# Patient Record
Sex: MALE | Race: WHITE | NOT HISPANIC OR LATINO | Employment: OTHER | URBAN - METROPOLITAN AREA
[De-identification: names, ages, dates, MRNs, and addresses within clinical notes are randomized per-mention and may not be internally consistent; named-entity substitution may affect disease eponyms.]

---

## 2017-01-16 ENCOUNTER — ALLSCRIPTS OFFICE VISIT (OUTPATIENT)
Dept: OTHER | Facility: OTHER | Age: 82
End: 2017-01-16

## 2017-04-10 ENCOUNTER — ALLSCRIPTS OFFICE VISIT (OUTPATIENT)
Dept: OTHER | Facility: OTHER | Age: 82
End: 2017-04-10

## 2017-04-18 ENCOUNTER — ALLSCRIPTS OFFICE VISIT (OUTPATIENT)
Dept: OTHER | Facility: OTHER | Age: 82
End: 2017-04-18

## 2017-06-06 ENCOUNTER — GENERIC CONVERSION - ENCOUNTER (OUTPATIENT)
Dept: OTHER | Facility: OTHER | Age: 82
End: 2017-06-06

## 2017-07-18 ENCOUNTER — ALLSCRIPTS OFFICE VISIT (OUTPATIENT)
Dept: OTHER | Facility: OTHER | Age: 82
End: 2017-07-18

## 2017-10-04 ENCOUNTER — ALLSCRIPTS OFFICE VISIT (OUTPATIENT)
Dept: OTHER | Facility: OTHER | Age: 82
End: 2017-10-04

## 2017-10-05 ENCOUNTER — GENERIC CONVERSION - ENCOUNTER (OUTPATIENT)
Dept: OTHER | Facility: OTHER | Age: 82
End: 2017-10-05

## 2017-10-18 ENCOUNTER — ALLSCRIPTS OFFICE VISIT (OUTPATIENT)
Dept: OTHER | Facility: OTHER | Age: 82
End: 2017-10-18

## 2017-10-27 NOTE — PROGRESS NOTES
Assessment  Assessed    1  Arteriosclerosis of coronary artery (414 00) (I25 10)   2  Atrial flutter (427 32) (I48 92)   3  Hypercholesterolemia (272 0) (E78 00)   4  Hypertension (401 9) (I10)   5  Paroxysmal atrial fibrillation (427 31) (I48 0)   6  History of CABG    Plan  Atrial flutter    · EKG/ECG- POC; Status:Complete;   Done: 56OZI0106 02:18PM   Perform: In Office; Last Updated Jyothi Sunshine; 10/4/2017 2:19:04 PM;Ordered; For:Atrial flutter; Ordered By:Miguel Mejia;  Paroxysmal atrial fibrillation    · Eliquis 2 5 MG Oral Tablet; 1 TAB TWICE A DAY   Rx By: Sarah Santos; Dispense: 60 Days ; #:60 Tablet; Refill: 3;For: Paroxysmal atrial fibrillation; CHELSY = N; [facility]: [facility]   · Follow-up visit in 3 months Evaluation and Treatment  Follow-up  Status: Hold For -  Scheduling  Requested for: 17IQY9227   Ordered; For: Paroxysmal atrial fibrillation; Ordered By: Sarah Santos Performed:  Due: 27QJP2157   · A diet that is low in fat, cholesterol, and sodium is considered a cardiac diet ;  Status:Complete;   Done: 63CBN0734 02:29PM   Ordered; For:Paroxysmal atrial fibrillation; Ordered By:Miguel Mejia;   · Begin or continue regular aerobic exercise  Gradually work up to at least {count1}  sessions of {dur1} of exercise a week ; Status:Complete;   Done: 49AKI0778 02:29PM   Ordered; For:Paroxysmal atrial fibrillation; Ordered By:Miguel Mejia; Discussion/Summary  Cardiology Discussion Summary Free Text Note Form [facility]:   CAD: s/p CABG x5 in 1995, stable and asymptomatic  Continue medical therapy with ASA, statin, and ACE-I  He could not tolerate the B-blocker due to extreme fatigue and low BPs - but now restarted on metoprolol due to atrial tachycardia and NSVT seen on PPM interrogation  Tolerating it thus far - have recommended that he takes his metoprolol at night to reduce the fatigue  flutter: now also with paroxysms of atrial fibrillation  Not on chronic anticoagulation  Continue regular PPM interrogations  Started on metoprolol and noted to have overall less tachycardia than before starting on B-blocker  CHADS2-Vasc score of 4 - will initiate on Eliquis 2 5mg BID  well controlled on current regimen  continue statin  LDL 83 in Sept 2016, well controlled  Recheck after next visit  Counseling Documentation With Imm: The patient was counseled regarding diagnostic results,-- instructions for management,-- risk factor reductions,-- impressions  total time of encounter was 25 minutes-- and-- 15 minutes was spent counseling  Chief Complaint  Chief Complaint Free Text Note Form: 5 month cardiac follow up  No cardiac complaints  EKG today  History of Present Illness  Cardiology (Follow-Up): The patient states he has been generally doing well since the last visit  Comorbid Illnesses: hypertension,-- hyperlipidemia-- and-- CAD s/p CABG x5 in 1995; atrial flutter, s/p PPM    Interval Events: Feels well, no complaints  Had 4 5 hrs of afib on most recent device interrogation- asymptomatic from it  Symptoms: denies chest pain at rest,-- denies exertional chest pain,-- denies dyspnea,-- denies fatigue,-- denies exercise intolerance,-- denies palpitations,-- denies edema,-- denies orthopnea,-- denies claudication,-- denies dizziness-- and-- denies orthostatic dizziness  Associated symptoms: no syncope,-- no PND,-- no tendency for easy bleeding,-- no tendency for easy bruising,-- no TIA symptoms-- and-- no recent weight gain  His symptoms do not limit his activities  Disease Monitoring:   Medications: the patient is adherent with his medication regimen  -- He denies medication side effects  Review of Systems  Cardiology Male ROS:     Cardiac: No complaints of chest pain, no palpitations, no fainiting  Skin: No complaints of nonhealing sores or skin rash     Genitourinary: No complaints of recurrent urinary tract infections, frequent urination at night, difficult urination, blood in urine, kidney stones, loss of bladder control, no kidney or prostate problems, no erectile dysfunction  Psychological: No complaints of feeling depressed, anxiety, panic attacks, or difficulty concentrating  General: No complaints of trouble sleeping, lack of energy, fatigue, appetite changes, weight changes, fever, frequent infections, or night sweats  Respiratory: No complaints of shortness of breath, cough with sputum, or wheezing  HEENT: No complaints of serious problems, hearing problems, nose problems, throat problems, or snoring  Gastrointestinal: No complaints of liver problems, nausea, vomiting, heartburn, constipation, bloody stools, diarrhea, problems swallowing, adbominal pain, or rectal bleeding  Hematologic: No complaints of bleeding disorders, anemia, blood clots, or excessive brusing  Neurological: No complaints of numbness, tingling, dizziness, weakness, seizures, headaches, syncope or fainting, AM fatigue, daytime sleepiness, no witnessed apnea episodes  Musculoskeletal: No complaints of arthritis, back pain, or painfull swelling  ROS Reviewed:   ROS reviewed  Active Problems  Problems    1  Arteriosclerosis of coronary artery (414 00) (I25 10)   2  Arthritis (716 90) (M19 90)   3  Atrial flutter (427 32) (I48 92)   4  Cataract (366 9) (H26 9)   5  Diabetes Mellitus (250 00)   6  Hypercholesterolemia (272 0) (E78 00)   7  Hypertension (401 9) (I10)   8  Memory Lapses Or Loss (780 93)   9  Paroxysmal atrial fibrillation (427 31) (I48 0)    Past Medical History  Problems    1  History of Atrial flutter (427 32) (I48 92)   2  History of sinus bradycardia (V12 59) (Z86 79)  Active Problems And Past Medical History Reviewed: The active problems and past medical history were reviewed and updated today  Surgical History  Problems    1  History of CABG   2  History of Catheter Ablation   3  History of Complete Colonoscopy   4  History of Hernia Repair   5  History of Pacemaker Placement  Surgical History Reviewed: The surgical history was reviewed and updated today  Family History  Father    1  Family history of Diabetes Mellitus (V18 0)   2  Family history of Hypertension (V17 49)   3  Family history of Reported Family History Of Cancer  Family History Reviewed: The family history was reviewed and updated today  Social History  Problems    · Marital History -  (V61 03)   · No drug use   · Social alcohol use (Z78 9)  Social History Reviewed: The social history was reviewed and updated today  The social history was reviewed and is unchanged  Current Meds   1  Aspirin 81 MG TABS; TAKE 1 TABLET DAILY; Therapy: 36MUE5893 to (Evaluate:68Bzh9506); Last Rx:02Jun2014 Ordered   2  Donepezil HCl - 10 MG Oral Tablet; TAKE 1 TABLET DAILY; Therapy: (Recorded:07Sep2016) to Recorded   3  Flomax 0 4 MG Oral Capsule; TAKE 1 CAPSULE Daily Recorded   4  Glimepiride 1 MG Oral Tablet; TAKE 1 TABLET DAILY; Therapy: (Recorded:72Tdq8923) to Recorded   5  Keppra 250 MG Oral Tablet; TAKE 0 5 TABLET Twice daily Recorded   6  Lisinopril 2 5 MG Oral Tablet; TAKE 1 TABLET DAILY; Therapy: (29-29-69-33) to Recorded   7  Metoprolol Succinate ER 25 MG Oral Tablet Extended Release 24 Hour; Take 1 tablet   daily; Therapy: 43EAC3477 to (Evaluate:05Jan2017); Last Rx:69Cbu1987 Ordered   8  Namenda 10 MG Oral Tablet; Take 1 tablet daily Recorded   9  NovoLOG Mix 70/30 (70-30) 100 UNIT/ML Subcutaneous Suspension; INJECT 25 UNIT   Every morning; Therapy: 67OGX7380 to (Evaluate:16Jun2017) Recorded   10  Simvastatin 10 MG Oral Tablet; Therapy: 28SXZ0516 to  Requested for: 39GAH3525 Recorded   11  Synthroid 125 MCG Oral Tablet; TAKE 1 TABLET DAILY; Therapy: (29-29-69-33) to Recorded  Medication List Reviewed: The medication list was reviewed and updated today  Allergies  Medication    1   No Known Drug Allergies    Vitals  Vital Signs Recorded: 79FHW2174 02:18PM   Heart Rate 71, Apical   Systolic 071, LUE, Sitting   Diastolic 68, LUE, Sitting   Height 5 ft 10 in   Weight 185 lb    BMI Calculated 26 54   BSA Calculated 2 02   O2 Saturation 98, RA     Physical Exam    Constitutional - General appearance: No acute distress, well appearing and well nourished  Eyes - Conjunctiva and Sclera examination: Conjunctiva pink, sclera anicteric  Neck - Normal, no JVD   Pulmonary - Respiratory effort: No signs of respiratory distress  -- Auscultation of lungs: Clear to auscultation  Cardiovascular - Auscultation of heart: Normal rate and rhythm, normal S1 and S2, no murmurs  -- Pedal pulses: Normal, 2+ bilaterally  -- Examination of extremities for edema and/or varicosities: Normal     Abdomen - Soft  Musculoskeletal - Gait and station: Normal gait  Skin - Skin: Normal without rashes  Skin is warm and well perfused  Neurologic - Speech normal  No focal deficits  Psychiatric - Orientation to person, place, and time: Normal       Results/Data  ECG Report:   Rhythm and rate: ectopic atrial rhythm with Electronic Ventricular paced rhythm with junctional retrograde conduction of atria        Future Appointments    Date/Time Provider Specialty Site   10/18/2017 08:30 AM Cardiology, Device Remote   [facility]     Signatures   Electronically signed by : Emilia Fothergill, M D ; Oct  4 2017  2:36PM EST                       (Author)

## 2018-01-13 VITALS
DIASTOLIC BLOOD PRESSURE: 60 MMHG | BODY MASS INDEX: 25.65 KG/M2 | HEART RATE: 72 BPM | HEIGHT: 70 IN | SYSTOLIC BLOOD PRESSURE: 108 MMHG | WEIGHT: 179.19 LBS

## 2018-01-14 VITALS
SYSTOLIC BLOOD PRESSURE: 138 MMHG | HEART RATE: 71 BPM | WEIGHT: 185 LBS | BODY MASS INDEX: 26.48 KG/M2 | DIASTOLIC BLOOD PRESSURE: 68 MMHG | OXYGEN SATURATION: 98 % | HEIGHT: 70 IN

## 2018-01-18 NOTE — CONSULTS
I had the pleasure of evaluating your patient, El Stewart  My full evaluation follows:      Chief Complaint  5 month cardiac follow up  No cardiac complaints  EKG today  History of Present Illness  The patient states he has been generally doing well since the last visit  Comorbid Illnesses: hypertension, hyperlipidemia and CAD s/p CABG x5 in 1995; atrial flutter, s/p PPM    Interval Events: Feels well, no complaints  Had 4 5 hrs of afib on most recent device interrogation- asymptomatic from it  Symptoms: denies chest pain at rest, denies exertional chest pain, denies dyspnea, denies fatigue, denies exercise intolerance, denies palpitations, denies edema, denies orthopnea, denies claudication, denies dizziness and denies orthostatic dizziness  Associated symptoms: no syncope, no PND, no tendency for easy bleeding, no tendency for easy bruising, no TIA symptoms and no recent weight gain  His symptoms do not limit his activities  Disease Monitoring:   Medications: the patient is adherent with his medication regimen  He denies medication side effects  Review of Systems      Cardiac: No complaints of chest pain, no palpitations, no fainiting  Skin: No complaints of nonhealing sores or skin rash  Genitourinary: No complaints of recurrent urinary tract infections, frequent urination at night, difficult urination, blood in urine, kidney stones, loss of bladder control, no kidney or prostate problems, no erectile dysfunction  Psychological: No complaints of feeling depressed, anxiety, panic attacks, or difficulty concentrating  General: No complaints of trouble sleeping, lack of energy, fatigue, appetite changes, weight changes, fever, frequent infections, or night sweats  Respiratory: No complaints of shortness of breath, cough with sputum, or wheezing  HEENT: No complaints of serious problems, hearing problems, nose problems, throat problems, or snoring     Gastrointestinal: No complaints of liver problems, nausea, vomiting, heartburn, constipation, bloody stools, diarrhea, problems swallowing, adbominal pain, or rectal bleeding  Hematologic: No complaints of bleeding disorders, anemia, blood clots, or excessive brusing  Neurological: No complaints of numbness, tingling, dizziness, weakness, seizures, headaches, syncope or fainting, AM fatigue, daytime sleepiness, no witnessed apnea episodes  Musculoskeletal: No complaints of arthritis, back pain, or painfull swelling  ROS reviewed  Active Problems    1  Arteriosclerosis of coronary artery (414 00) (I25 10)   2  Arthritis (716 90) (M19 90)   3  Atrial flutter (427 32) (I48 92)   4  Cataract (366 9) (H26 9)   5  Diabetes Mellitus (250 00)   6  Hypercholesterolemia (272 0) (E78 00)   7  Hypertension (401 9) (I10)   8  Memory Lapses Or Loss (780 93)   9  Paroxysmal atrial fibrillation (427 31) (I48 0)    Past Medical History    · History of Atrial flutter (427 32) (I48 92)   · History of sinus bradycardia (V12 59) (Z86 79)    The active problems and past medical history were reviewed and updated today  Surgical History    · History of CABG   · History of Catheter Ablation   · History of Complete Colonoscopy   · History of Hernia Repair   · History of Pacemaker Placement    The surgical history was reviewed and updated today  Family History    · Family history of Diabetes Mellitus (V18 0)   · Family history of Hypertension (V17 49)   · Family history of Reported Family History Of Cancer    The family history was reviewed and updated today  Social History    · Marital History -  (V61 03)   · No drug use   · Social alcohol use (Z78 9)  The social history was reviewed and updated today  The social history was reviewed and is unchanged  Current Meds   1  Aspirin 81 MG TABS; TAKE 1 TABLET DAILY; Therapy: 15OOE5745 to (Evaluate:28Vit4140); Last Rx:02Jun2014 Ordered   2   Donepezil HCl - 10 MG Oral Tablet; TAKE 1 TABLET DAILY; Therapy: (Recorded:07Sep2016) to Recorded   3  Flomax 0 4 MG Oral Capsule; TAKE 1 CAPSULE Daily Recorded   4  Glimepiride 1 MG Oral Tablet; TAKE 1 TABLET DAILY; Therapy: (Recorded:10Apr2017) to Recorded   5  Keppra 250 MG Oral Tablet; TAKE 0 5 TABLET Twice daily Recorded   6  Lisinopril 2 5 MG Oral Tablet; TAKE 1 TABLET DAILY; Therapy: (02 [phone]) to Recorded   7  Metoprolol Succinate ER 25 MG Oral Tablet Extended Release 24 Hour; Take 1 tablet   daily; Therapy: 25FPS8943 to (Evaluate:05Jan2017); Last Rx:07Sep2016 Ordered   8  Namenda 10 MG Oral Tablet; Take 1 tablet daily Recorded   9  NovoLOG Mix 70/30 (70-30) 100 UNIT/ML Subcutaneous Suspension; INJECT 25 UNIT   Every morning; Therapy: 06TUJ3779 to (Evaluate:16Jun2017) Recorded   10  Simvastatin 10 MG Oral Tablet; Therapy: 97LRR4602 to  Requested for: 69WBR8888 Recorded   11  Synthroid 125 MCG Oral Tablet; TAKE 1 TABLET DAILY; Therapy: ([phone] [phone]) to Recorded    The medication list was reviewed and updated today  Allergies    1  No Known Drug Allergies    Vitals   Recorded: 45ANP5524 02:18PM   Heart Rate 71, Apical   Systolic 416, LUE, Sitting   Diastolic 68, LUE, Sitting   Height 5 ft 10 in   Weight 185 lb    BMI Calculated 26 54   BSA Calculated 2 02   O2 Saturation 98, RA     Physical Exam    Constitutional - General appearance: No acute distress, well appearing and well nourished  Eyes - Conjunctiva and Sclera examination: Conjunctiva pink, sclera anicteric  Neck - Normal, no JVD   Pulmonary - Respiratory effort: No signs of respiratory distress  Auscultation of lungs: Clear to auscultation  Cardiovascular - Auscultation of heart: Normal rate and rhythm, normal S1 and S2, no murmurs  Pedal pulses: Normal, 2+ bilaterally  Examination of extremities for edema and/or varicosities: Normal     Abdomen - Soft  Musculoskeletal - Gait and station: Normal gait  Skin - Skin: Normal without rashes  Skin is warm and well perfused  Neurologic - Speech normal  No focal deficits  Psychiatric - Orientation to person, place, and time: Normal       Results/Data    Rhythm and rate: ectopic atrial rhythm with Electronic Ventricular paced rhythm with junctional retrograde conduction of atria  Assessment    1  Arteriosclerosis of coronary artery (414 00) (I25 10)   2  Atrial flutter (427 32) (I48 92)   3  Hypercholesterolemia (272 0) (E78 00)   4  Hypertension (401 9) (I10)   5  Paroxysmal atrial fibrillation (427 31) (I48 0)   6  History of CABG    Plan  Atrial flutter    · EKG/ECG- POC; Status:Complete;   Done: 81EVT8309 02:18PM   Perform: In Office; Last Updated Facundo Gifford; 10/4/2017 2:19:04 PM;Ordered; For:Atrial flutter; Ordered By:Miguel Mejia;  Paroxysmal atrial fibrillation    · Eliquis 2 5 MG Oral Tablet; 1 TAB TWICE A DAY   Rx By: Clau Warren; Dispense: 60 Days ; #:60 Tablet; Refill: 3; For: Paroxysmal atrial fibrillation; CHELSY = N; [facility]   · Follow-up visit in 3 months Evaluation and Treatment  Follow-up  Status: Hold For -  Scheduling  Requested for: 06NGO3967   Ordered; For: Paroxysmal atrial fibrillation; Ordered By: Clau Warren Performed:  Due: 80RZE6429   · A diet that is low in fat, cholesterol, and sodium is considered a cardiac diet ;  Status:Complete;   Done: 92HKH6364 02:29PM   Ordered; For:Paroxysmal atrial fibrillation; Ordered By:Miguel Mejia;   · Begin or continue regular aerobic exercise  Gradually work up to at least {count1}  sessions of {dur1} of exercise a week ; Status:Complete;   Done: 40ELT9424 02:29PM   Ordered; For:Paroxysmal atrial fibrillation; Ordered By:Miguel Mejia; Discussion/Summary    CAD: s/p CABG x5 in 1995, stable and asymptomatic  Continue medical therapy with ASA, statin, and ACE-I   He could not tolerate the B-blocker due to extreme fatigue and low BPs - but now restarted on metoprolol due to atrial tachycardia and NSVT seen on PPM interrogation  Tolerating it thus far - have recommended that he takes his metoprolol at night to reduce the fatigue  Atrial flutter: now also with paroxysms of atrial fibrillation  Not on chronic anticoagulation  Continue regular PPM interrogations  Started on metoprolol and noted to have overall less tachycardia than before starting on B-blocker  CHADS2-Vasc score of 4 - will initiate on Eliquis 2 5mg BID  HTN: well controlled on current regimen  HLD: continue statin  LDL 83 in Sept 2016, well controlled  Recheck after next visit  The patient was counseled regarding diagnostic results, instructions for management, risk factor reductions, impressions  total time of encounter was 25 minutes and 15 minutes was spent counseling  Thank you very much for allowing me to participate in the care of this patient  If you have any questions, please do not hesitate to contact me        Future Appointments    Signatures   Electronically signed by : HATTIE Mack ; Oct  4 2017  2:36PM EST                       (Author)

## 2018-03-07 NOTE — PROGRESS NOTES
"  Discussion/Summary  Normal device function      Results/Data  Cardiac Device Remote 18Apr2017 12:04PM Josiane Montero     Test Name Result Flag Reference   MISCELLANEOUS COMMENT (Report)     LATITUDE TRANSMISSION: BATTERY VOLTAGE ADEQUATE (4 YRS)  AP-14%, -99% (>40% CHB)  ALL AVAILABLE LEAD PARAMETERS WITHIN NORMAL LIMITS  1 NSVT EPISODE FOR 8 BEATS, AVG CL~360MS  EF-55-65% (ECHO 5/17/12)  1 ATR EPISODE LASTING 7 SEC- PAT ON EGM  10 PMT EPISODES- ST ON EGM'S  PT ON ASA 81MG & METOPROLOL  NORMAL DEVICE FUNCTION  GV   Cardiac Electrophysiology Report      [id number]  pdf   DEVICE TYPE Pacemaker       Cardiac Electrophysiology Report [id number] 12:04PM Josiane Montero     Test Name Result Flag Reference   Cardiac Electrophysiology Report      [id number]  pdf     Signatures   Electronically signed by : Lizbeth Monge RN; Apr 18 2017  9:19AM EST                       (Author)    Electronically signed by : HATTIE Beverly ; Apr 18 2017  1:13PM EST                       (Author)    "

## 2018-03-07 NOTE — PROGRESS NOTES
"  Discussion/Summary  Normal device function      Results/Data  Cardiac Device Remote [id number] 11:57AM Donna Martinez     Test Name Result Flag Reference   MISCELLANEOUS COMMENT (Report)     LATITUDE TRANSMISSION: BATTERY VOLTAGE ADEQUATE (4 YRS); AP = 15%,  = 99% (>40%/DDD 50 - CHB); 2 NSVT EPISODES WITH DURATION @ 5 BEATS/AVG  MS & 7 BEATS/AVG  MS; 2 PMT EPISODES FOR SINUS TACH vs  PAT; HX OF SAME WITH EF = 55-65% (5/2012); PT TAKES ASA 81, METOPROLOL; TASK TO DR BEAUCHAMP; ALL AVAILABLE LEAD PARAMETERS WITHIN NORMAL LIMITS; PACEMAKER FUNCTIONING APPROPRIATELY  eb   Cardiac Electrophysiology Report      [id number]  lat[place]  1 16 17 pdf   DEVICE TYPE Pacemaker       Cardiac Electrophysiology Report [id number] 11:57AM Donna Martinez     Test Name Result Flag Reference   Cardiac Electrophysiology Report      [id number] pdf     Signatures   Electronically signed by : Chanell Pina, ; Jan 16 2017  4:07PM EST                       (Author)    Electronically signed by : Jak Rivera DO; Jan 23 2017  4:03AM EST                       (Author)    "

## 2018-03-07 NOTE — PROGRESS NOTES
"  Discussion/Summary  Normal device function      Results/Data  Cardiac Device In Clinic [id number] 06:40PM Micky Plascencia     Test Name Result Flag Reference   MISCELLANEOUS COMMENT (Report)     DEVICE INTERROGATED IN THE [place] OFFICE: BATTERY VOLTAGE ADEQUATE  (3 5 YR)  AP 13%  99 9% (>40%/UNDERLYING HR ~ 30BPM)  ALL LEAD PARAMETERS WITHIN NORMAL LIMITS  1 NEW NSVT EPISODE WITHE EGM SHOWING NSVT (4 @ 147BPM)  10 ATR EPISODES WITH EGMS SHOWING AF (LONGEST IS 4 HR 43 MIN  )  5 PMT EPISODES FOR STACH  PT TAKES ASA 81MG AND METOPROLOL SUCC  Bethanie Ramos PT IS NOT ON AC  EF 55% (ECHO 5/2012)  TASKED AND SPOKE TO DR Donna Cespedes  NO PROGRAMMING CHANGES MADE TO DEVICE PARAMETERS  PACEMAKER FUNCTIONING APPROPRIATELY  RG   Cardiac Electrophysiology Report      [id number] 7-18-17  pdf   DEVICE TYPE Pacemaker       Cardiac Electrophysiology Report [id number] 06:40PM Micky Plascencia     Test Name Result Flag Reference   Cardiac Electrophysiology Report      [id number]  pdf     Signatures   Electronically signed by : Antonette Walton, ; Jul 18 2017  3:49PM EST                       (Author)    Electronically signed by : HATTIE Fuentes ; Jul 18 2017  4:30PM EST                       (Author)    "

## 2018-03-07 NOTE — PROGRESS NOTES
"  Discussion/Summary  Normal device function      Results/Data  Results   Cardiac Device Remote [id number] 01:03PM Luis Enriqueneta [facility]     Test Name Result Flag Reference   MISCELLANEOUS COMMENT (Report)     LATITUDE TRANSMISSION: BATTERY VOLTAGE ADEQUATE (4 5 YRS)  AP-13%, -98% (>40% DDD @ 50 PPM)  5 NSVT EPISODES LONGEST 25 BEATS, AVG CL~370MS  EF-55-65% (ECHO 7/17/12)  PT ON ASA 81MG; INTOLERANT TO BETA BLOCKERS  6 ATR EPISODES OF A FLUTTER LONGEST 7 3 MINS  HX: PAFL  TASKED DR Saleem Arciniega  20 PMT EPISODES DUE TO AT/ST (ONSET NOT SEEN)  ALL AVAILABLE LEAD PARAMETERS WITHIN NORMAL LIMITS  NORMAL DEVICE FUNCTION  GV   Cardiac Electrophysiology Report      [id number]  pdf   DEVICE TYPE Pacemaker       Cardiac Electrophysiology Report [id number] 01:03PM Johnnya [employer]     Test Name Result Flag Reference   Cardiac Electrophysiology Report      [id number]  pdf     Signatures   Electronically signed by : Rakesh Fang RN; Mar  3 2016  4:15PM EST                       (Author)    Electronically signed by : Juno Humphries DO; Mar  7 2016  7:29PM EST                       (Author)    "

## 2018-03-07 NOTE — PROGRESS NOTES
"  Discussion/Summary  Normal device function      Results/Data  Cardiac Device Remote 18Oct2017 12:32PM Shilpa Busing     Test Name Result Flag Reference   MISCELLANEOUS COMMENT (Report)     LATITUDE TRANSMISSION: BATTERY VOLTAGE ADEQUATE (3 5 YR)  AP-10% -99% (>40%/DDD 50)  ALL LEAD PARAMETERS APPEAR WITHIN NORMAL LIMITS & STABLE  3 NSVT EPISODE WITH EGRM SHOWING NSVT  EPISODE #V-55 WITH LONGEST DURATION @ 18 BEATS/AVG  MS  19 AHR EPISODES WITH EGRMS SHOWING PAF /PAFL - LONGEST EPISODE @ ~ 2 HR  5 PMT EPISODES FOR STACH  EF 55% (ECHO 5/2012)  PT TAKES ASA 81, ELIQUIS, METOPROLOL SUCC  TASK TO DR BEAUCHAMP  PACEMAKER FUNCTIONING APPROPRIATELY  eb   Cardiac Electrophysiology Report      [id number]  latitude  10 18 17 pdf   DEVICE TYPE Pacemaker       Cardiac Electrophysiology Report [id number] 12:32PM Shilpa Busing     Test Name Result Flag Reference   Cardiac Electrophysiology Report      [id number]  pdf     Signatures   Electronically signed by : Dwayne Pearson, ; Oct 18 2017 10:08AM EST                       (Author)    Electronically signed by : Vanessa Swanson DO; Oct 21 2017 10:57AM EST                       (Author)    "

## 2018-03-07 NOTE — PROGRESS NOTES
"  Discussion/Summary  Normal device function     Episode of  NSVT -9-10 beats  Sinus tachycardia  Atrial tachycardia     not on beta blockers   retained EF    use rate control if has symptoms  Results/Data  Cardiac Device In Clinic [id number] 06:47PM Trish Brennan     Test Name Result Flag Reference   MISCELLANEOUS COMMENT (Report)     DEVICE INTERROGATED IN THE [facility]: BATTERY VOLTAGE ADEQUATE (4 5 YRS)  AP 10%  98% (>40% - CHB)  ALL LEAD PARAMETERS WITHIN NORMAL LIMITS  4 NONSUSTV EPISODES WITH 2 AVAILABLE EGRAMS - 10 BEATS @ 165 BPM AND 9 BEATS @ 159 BPM  PT NOT ON BB - DOES NOT TOLERATE  EF 55-65% (ECHO MAY 2012)  [facility]  5 PMT EPISODES WITH 3 EGRAMS SHOWING SINUS TACH  3 ATR EPISODES WITH 3 EGRAMS SHOWING SHORT BURSTS (LONGEST 9 SEC) OF PAT  NO PROGRAMMING CHANGES MADE TO DEVICE PARAMETERS  PACEMAKER FUNCTIONING APPROPRIATELY   CP   Cardiac Electrophysiology Report      [id number] joseph pdf   DEVICE TYPE Pacemaker       Cardiac Electrophysiology Report [id number] 06:47PM Trish Brennan     Test Name Result Flag Reference   Cardiac Electrophysiology Report      [id number] pdf     Signatures   Electronically signed by : Cindy Bolden, ; Jul 13 2016  7:57AM EST                       (Author)    Electronically signed by : HATTIE Wilde ; Jul 19 2016  2:20PM EST                       (Author)    "

## 2018-03-07 NOTE — PROGRESS NOTES
"  Discussion/Summary  Normal device function      Results/Data  Cardiac Device Remote 12Oct2016 01:17PM Peng Prince     Test Name Result Flag Reference   MISCELLANEOUS COMMENT (Report)     LATITUDE TRANSMISSION: BATTERY VOLTAGE ADEQUATE (4 5 YRS)  AP-16%, -99% (>40% DDD @ 50 PPM)  2 NSVT EPISODES- 14 BEATS, AVG CL~340MS & 6 BEATS, AVG CL~350MS  EF-55-65% (ECHO 7/17/12)  3 ATR EPISODES OF A TACH LONGEST 11 SEC  21 PMT EPISODES- ST ON AVAILABLE EGM'S  PT ON ASA 81MG & METOPROLOL  ALL AVAILABLE LEAD PARAMETERS WITHIN NORMAL LIMITS  NORMAL DEVICE FUNCTION  GV   Cardiac Electrophysiology Report      [id number]  pdf   DEVICE TYPE Pacemaker       Cardiac Electrophysiology Report [id number] 01:17PM Peng Prince     Test Name Result Flag Reference   Cardiac Electrophysiology Report      [id number]  pdf     Signatures   Electronically signed by : Jimenez Gunn RN; Oct 13 2016 12:16PM EST                       (Author)    Electronically signed by : HATTIE Renner ; Oct 13 2016  7:42PM EST                       (Author)    "

## 2018-03-12 ENCOUNTER — OFFICE VISIT (OUTPATIENT)
Dept: CARDIOLOGY CLINIC | Facility: MEDICAL CENTER | Age: 83
End: 2018-03-12
Payer: MEDICARE

## 2018-03-12 VITALS — SYSTOLIC BLOOD PRESSURE: 114 MMHG | HEART RATE: 100 BPM | DIASTOLIC BLOOD PRESSURE: 66 MMHG

## 2018-03-12 DIAGNOSIS — I10 ESSENTIAL HYPERTENSION: ICD-10-CM

## 2018-03-12 DIAGNOSIS — I25.10 ARTERIOSCLEROSIS OF CORONARY ARTERY: ICD-10-CM

## 2018-03-12 DIAGNOSIS — E78.00 HYPERCHOLESTEROLEMIA: ICD-10-CM

## 2018-03-12 DIAGNOSIS — I48.0 PAROXYSMAL ATRIAL FIBRILLATION (HCC): ICD-10-CM

## 2018-03-12 DIAGNOSIS — I48.92 ATRIAL FLUTTER, UNSPECIFIED TYPE (HCC): Primary | ICD-10-CM

## 2018-03-12 PROCEDURE — 93000 ELECTROCARDIOGRAM COMPLETE: CPT | Performed by: INTERNAL MEDICINE

## 2018-03-12 PROCEDURE — 99215 OFFICE O/P EST HI 40 MIN: CPT | Performed by: INTERNAL MEDICINE

## 2018-03-12 RX ORDER — INSULIN ASPART 100 [IU]/ML
INJECTION, SUSPENSION SUBCUTANEOUS
COMMUNITY
Start: 2014-02-17 | End: 2019-03-21 | Stop reason: ALTCHOICE

## 2018-03-12 RX ORDER — LEVOTHYROXINE SODIUM 88 UG/1
1 CAPSULE ORAL DAILY
COMMUNITY
Start: 2018-03-08 | End: 2020-01-02 | Stop reason: SDUPTHER

## 2018-03-12 RX ORDER — METOPROLOL SUCCINATE 25 MG/1
1 TABLET, EXTENDED RELEASE ORAL DAILY
COMMUNITY
Start: 2018-03-07

## 2018-03-12 RX ORDER — MEMANTINE HYDROCHLORIDE 10 MG/1
1 TABLET ORAL 2 TIMES DAILY
COMMUNITY
Start: 2018-03-07

## 2018-03-12 RX ORDER — DONEPEZIL HYDROCHLORIDE 10 MG/1
1 TABLET, FILM COATED ORAL DAILY
COMMUNITY
Start: 2018-03-07

## 2018-03-12 RX ORDER — ROSUVASTATIN CALCIUM 10 MG/1
1 TABLET, COATED ORAL DAILY
COMMUNITY
Start: 2018-03-07

## 2018-03-12 RX ORDER — LEVETIRACETAM 250 MG/1
0.5 TABLET ORAL 2 TIMES DAILY
COMMUNITY
Start: 2018-03-07 | End: 2019-03-21 | Stop reason: ALTCHOICE

## 2018-03-12 RX ORDER — TAMSULOSIN HYDROCHLORIDE 0.4 MG/1
1 CAPSULE ORAL DAILY
COMMUNITY
Start: 2018-03-07

## 2018-03-12 RX ORDER — LISINOPRIL 2.5 MG/1
1 TABLET ORAL DAILY
COMMUNITY
Start: 2018-03-07

## 2018-03-12 NOTE — PROGRESS NOTES
Cardiology Follow Up    Darío Murry  1933  [id number]  [address] [facility]  [address] [address]    1  Atrial flutter, unspecified type ([address] )  POCT ECG   2  Arteriosclerosis of coronary artery     3  Essential hypertension     4  Paroxysmal atrial fibrillation (HCC)     5  Hypercholesterolemia         Interval History: Patient feels well, no complaints  CAD s/p CABG x5 1995  Atrial flutter s/p PPM    Patient Active Problem List   Diagnosis    Arteriosclerosis of coronary artery    Atrial flutter ([address] )    Hypercholesterolemia    Hypertension    Paroxysmal atrial fibrillation ([facility] )     History reviewed  No pertinent past medical history  Social History     Social History    Marital status:      Spouse name: N/A    Number of children: N/A    Years of education: N/A     Occupational History    Not on file       Social History Main Topics    Smoking status: Light Tobacco Smoker    Smokeless tobacco: Never Used    Alcohol use No    Drug use: No    Sexual activity: Not on file     Other Topics Concern    Not on file     Social History Narrative    No narrative on file      Family History   Problem Relation Age of Onset    No Known Problems Mother     Diabetes Father     Cancer Father     Hypertension Father      Past Surgical History:   Procedure Laterality Date    AV NODE ABLATION      CARDIAC PACEMAKER PLACEMENT      COLONOSCOPY      CORONARY ARTERY BYPASS GRAFT      HERNIA REPAIR         Current Outpatient Prescriptions:     donepezil (ARICEPT) 10 mg tablet, Take 1 tablet by mouth daily, Disp: , Rfl:     insulin aspart protamine-insulin aspart (NOVOLOG MIX 70/30) 100 units/mL injection, Inject under the skin, Disp: , Rfl:     levETIRAcetam (KEPPRA) 250 mg tablet, Take 0 5 tablets by mouth 2 (two) times a day, Disp: , Rfl:     levothyroxine 125 mcg tablet, Take 1 tablet by mouth daily, Disp: , Rfl:     lisinopril (ZESTRIL) 2 5 mg tablet, Take 1 tablet by mouth daily, Disp: , Rfl:     memantine (NAMENDA) 10 mg tablet, Take 1 tablet by mouth 2 (two) times a day, Disp: , Rfl:     metoprolol succinate (TOPROL-XL) 25 mg 24 hr tablet, Take 1 tablet by mouth daily, Disp: , Rfl:     rosuvastatin (CRESTOR) 10 MG tablet, Take 1 tablet by mouth daily, Disp: , Rfl:     tamsulosin (FLOMAX) 0 4 mg, Take 1 capsule by mouth daily, Disp: , Rfl:   No Known Allergies    Labs:  No visits with results within 6 Month(s) from this visit  Latest known visit with results is:   No results found for any previous visit  No results found for: CHOL, TRIG, HDL, LDLDIRECT  Imaging: No results found  Review of Systems:  Review of Systems   Constitutional: Negative for activity change, appetite change, fatigue and fever  HENT: Negative for nosebleeds and sore throat  Eyes: Negative for photophobia and visual disturbance  Respiratory: Positive for cough  Negative for chest tightness, shortness of breath and wheezing  Cardiovascular: Negative for chest pain, palpitations and leg swelling  Gastrointestinal: Negative for abdominal pain, diarrhea, nausea and vomiting  Endocrine: Negative for polyuria  Genitourinary: Negative for dysuria, frequency and hematuria  Musculoskeletal: Negative for arthralgias, back pain and gait problem  Skin: Negative for pallor and rash  Neurological: Negative for dizziness, syncope, speech difficulty and light-headedness  Hematological: Does not bruise/bleed easily  Psychiatric/Behavioral: Negative for agitation, behavioral problems and confusion  Physical Exam:  Physical Exam   Constitutional: He is oriented to person, place, and time  He appears well-developed and well-nourished  No distress  HENT:   Head: Normocephalic and atraumatic  Nose: Nose normal    Eyes: EOM are normal  Pupils are equal, round, and reactive to light   No scleral icterus  Neck: Normal range of motion  Neck supple  No JVD present  Cardiovascular: Normal rate, regular rhythm, S1 normal and S2 normal   Exam reveals no gallop, no S3, no distant heart sounds and no friction rub  No murmur heard  No systolic murmur is present   Pulmonary/Chest: Effort normal and breath sounds normal  No respiratory distress  He has no wheezes  He has no rales  Abdominal: Soft  Bowel sounds are normal  He exhibits no distension  Musculoskeletal: He exhibits no edema or deformity  Neurological: He is alert and oriented to person, place, and time  No cranial nerve deficit  Skin: Skin is warm and dry  He is not diaphoretic  No erythema  Psychiatric: He has a normal mood and affect  His behavior is normal    Vitals reviewed  EKG:  Sinus tachycardia  Electronic ventricular pacemaker  Abnormal ECG    Discussion/Summary:  CAD: s/p CABG x5 in 1995, stable and asymptomatic  Continue medical therapy with ASA, statin, and ACE-I  He could not tolerate the B-blocker due to extreme fatigue and low BPs in the past - but now restarted on metoprolol due to atrial tachycardia and NSVT seen on PPM interrogation  Tolerating it thus far - have recommended that he takes his metoprolol at night to reduce the fatigue       Atrial flutter: now also with paroxysms of atrial fibrillation  Not on chronic anticoagulation  Continue regular PPM interrogations  Started on metoprolol and noted to have overall less tachycardia than before starting on B-blocker  CHADS2-Vasc score of 4 - we initiated on Eliquis 2 5mg BID, which he is tolerating well       HTN: well controlled on current regimen      HLD: continue statin  LDL 83 in Sept 2016, well controlled  Recheck now

## 2018-03-12 NOTE — PATIENT INSTRUCTIONS
Coronary Artery Disease   WHAT YOU NEED TO KNOW:   What is coronary artery disease? Coronary artery disease (CAD) is narrowing of the arteries to your heart caused by a buildup of plaque  Plaque is made up of cholesterol and other substances  The narrowing in your arteries decreases the amount of blood that can flow to your heart  This causes your heart to get less oxygen  What increases my risk for CAD? · Age 36 years or older     · A family history of CAD     · Smoking or regular exposure to secondhand smoke     · A medical condition, such as high blood pressure, high cholesterol, or diabetes     · Obesity or lack of exercise  What are the signs and symptoms of CAD? You may not have any symptoms of CAD  The most common symptom is chest pain, also called angina  Angina may feel like burning, squeezing, or crushing tightness in your chest  The pain may spread to your neck, jaw, or shoulder blade  You may have other symptoms along with chest pain  These include nausea, vomiting, sweating, fainting, and hands and feet that are cold to the touch  How is CAD diagnosed? Your healthcare provider will ask you if you have a family history of CAD  He will also ask about your symptoms and about the medicines you are taking  You may need any of the following:  · Blood tests  will check for high cholesterol or other medical conditions that may have led to CAD  · An EKG  records the electrical activity of your heart  It is used to check your heart rhythm, and it may show if there is damage to your heart  · An echocardiogram  is a type of ultrasound  Sound waves are used to show the structure, movement, and blood vessels of your heart  · An exercise stress test  helps healthcare providers see the changes that take place in your heart during exercise  An EKG is done while you ride an exercise bike or walk on a treadmill   Healthcare providers will ask if you have chest pain or trouble breathing during the test  An exercise test may be combined with an echocardiogram or nuclear isotope imaging  Nuclear isotopes are very small amounts of radioactive material that are injected into your bloodstream  Images show areas of your heart that have decreased blood flow  · A stress test with medicine  may be done if you cannot do the exercise stress test  You are given medicine that causes your heart to work harder  You will be connected to a stress test machine  This test is combined with the echocardiogram or nuclear isotope imaging  · An angiography, CT scan, or MRI  may be done to take pictures of your blood vessels and arteries  The pictures may show how narrow or blocked your blood vessels are  You may be given contrast liquid to help healthcare providers see the pictures better  Tell the healthcare provider if you have ever had an allergic reaction to contrast liquid  Which medicines are used to treat CAD? · Blood pressure medicines  are given to lower your blood pressure  These medicines may include ACE inhibitors and beta-blockers  ACE inhibitors help keep your blood vessels relaxed and open  This helps keep blood flowing into your heart  Beta-blockers keep your heart pumping strongly and regularly  This helps keep your heart from working too hard to get oxygen  · Cholesterol medicines  help lower blood cholesterol levels  · Nitrates , such as nitroglycerin, relax the arteries of your heart so it gets more oxygen  They help to relieve your chest pain  · Antiplatelets , such as aspirin, help prevent blood clots  Take your antiplatelet medicine exactly as directed  These medicines make it more likely for you to bleed or bruise  If you are told to take aspirin, do not take acetaminophen or ibuprofen instead  · Blood thinners  keep clots from forming in your blood  Clots may cause heart attacks, strokes, or death  This medicine makes it more likely for you to bleed or bruise       · Do not take certain medicines without asking your healthcare provider first   These include NSAIDs, herbal or vitamin supplements, or hormones (estrogen or progestin)  Which procedures are used to treat CAD? · An angioplasty  may be done to open an artery blocked by plaque  A tube with a balloon on the end is threaded into the blocked artery  Once the tube is in the artery, the balloon is inflated  As the balloon inflates, it presses the plaque against the artery wall to open the artery  A stent may be placed in your artery to keep it open  · Coronary artery bypass surgery (CABG)  is open heart surgery  Healthcare providers take arteries or veins from other areas in your body and use them to bypass or go around the blocked arteries of your heart  What is cardiac rehabilitation? Your healthcare provider may recommend that you attend cardiac rehabilitation (rehab)  This is a program run by specialists who will help you safely strengthen your heart and reduce the risk for more heart disease  The plan includes exercise, relaxation, stress management, and heart-healthy nutrition  Healthcare providers will also check to make sure any medicines you are taking are working  What can I do to manage CAD? · Do not smoke  Nicotine and other chemicals in cigarettes and cigars can cause heart and lung damage  Ask your healthcare provider for information if you currently smoke and need help to quit  E-cigarettes or smokeless tobacco still contain nicotine  Talk to your healthcare provider before you use these products  · Exercise regularly  Exercise at least 30 minutes each day, on most days of the week  Exercise helps to lower high cholesterol and high blood pressure  It can also help you maintain a healthy weight  Ask your healthcare provider about the kind of exercise you should do and how to get started  · Maintain a healthy weight  If you are overweight, talk to your healthcare provider about how to lose weight   A weight loss of 10% can improve your heart health  · Eat heart-healthy foods  Include fresh fruits and vegetables in your meal plan  Choose low-fat foods, such as skim or 1% fat milk, low-fat cheese and yogurt, fish, chicken (without skin), and lean meats  Eat two 4-ounce servings of fish high in omega-3 fats each week, such as salmon, fresh tuna, and herring  Do not eat foods that are high in sodium, such as canned foods, potato chips, salty snacks, and cold cuts  Put less table salt on your food  · Limit or do not drink alcohol  A drink of alcohol is 12 ounces of beer, 5 ounces of wine, or 1½ ounces of liquor  · Manage other health conditions  Follow your healthcare provider's advice on how to manage other conditions that can affect your heart health  These include diabetes, high blood pressure, and high cholesterol  You may need to take medicines for these conditions and make other lifestyle changes  · Ask if you should have a flu vaccine  The flu can be dangerous for a person who has CAD  The flu vaccine is available every year in the fall  Call 911 for any of the following:   · You have any of the following signs of a heart attack:      ¨ Squeezing, pressure, or pain in your chest that lasts longer than 5 minutes or returns    ¨ Discomfort or pain in your back, neck, jaw, stomach, or arm     ¨ Trouble breathing    ¨ Nausea or vomiting    ¨ Lightheadedness or a sudden cold sweat, especially with chest pain or trouble breathing    When should I contact my healthcare provider? · You have chest pain that is more frequent, or you have chest pain at rest      · You have questions or concerns about your condition or care  CARE AGREEMENT:   You have the right to help plan your care  Learn about your health condition and how it may be treated  Discuss treatment options with your caregivers to decide what care you want to receive  You always have the right to refuse treatment   The above information is an  only  It is not intended as medical advice for individual conditions or treatments  Talk to your doctor, nurse or pharmacist before following any medical regimen to see if it is safe and effective for you  © 2017 [employer] Information is for End User's use only and may not be sold, redistributed or otherwise used for commercial purposes  All illustrations and images included in CareNotes® are the copyrighted property of A [employer] M [employer]  or Manolo Travis

## 2018-07-24 ENCOUNTER — IN-CLINIC DEVICE VISIT (OUTPATIENT)
Dept: CARDIOLOGY CLINIC | Facility: MEDICAL CENTER | Age: 83
End: 2018-07-24
Payer: MEDICARE

## 2018-07-24 DIAGNOSIS — Z95.0 PRESENCE OF PERMANENT CARDIAC PACEMAKER: ICD-10-CM

## 2018-07-24 DIAGNOSIS — I48.0 PAROXYSMAL ATRIAL FIBRILLATION (HCC): Primary | ICD-10-CM

## 2018-07-24 DIAGNOSIS — I44.2 CHB (COMPLETE HEART BLOCK) (HCC): ICD-10-CM

## 2018-07-24 PROCEDURE — 93280 PM DEVICE PROGR EVAL DUAL: CPT | Performed by: INTERNAL MEDICINE

## 2018-07-24 NOTE — PROGRESS NOTES
[facility] [place] DUAL PPM  DEVICE INTERROGATED IN THE [employer] OFFICE:  BATTERY VOLTAGE ADEQUATE (2 5 YR)    AP 3%  99% (>40%/AVB/DDD 50PPM)   ALL AVAILABLE LEAD PARAMETERS WITHIN NORMAL LIMITS   10 NON SUST V AND 13 ATR WITH ALL AVAILABLE EGMS SHOWING AF/RVR (10%)   LONGEST EPISODE ~ 17 HR   PT TAKES ASA AND METOPROLOL, NO AC   TASKED TO DR BEAUCHAMP   NORMAL DEVICE FUNCTION   RG

## 2018-09-17 ENCOUNTER — OFFICE VISIT (OUTPATIENT)
Dept: CARDIOLOGY CLINIC | Facility: MEDICAL CENTER | Age: 83
End: 2018-09-17
Payer: MEDICARE

## 2018-09-17 VITALS
HEART RATE: 70 BPM | OXYGEN SATURATION: 99 % | DIASTOLIC BLOOD PRESSURE: 58 MMHG | SYSTOLIC BLOOD PRESSURE: 122 MMHG | HEIGHT: 70 IN

## 2018-09-17 DIAGNOSIS — I48.0 PAROXYSMAL ATRIAL FIBRILLATION (HCC): ICD-10-CM

## 2018-09-17 DIAGNOSIS — I10 HYPERTENSION, UNSPECIFIED TYPE: ICD-10-CM

## 2018-09-17 DIAGNOSIS — E78.00 HYPERCHOLESTEROLEMIA: ICD-10-CM

## 2018-09-17 DIAGNOSIS — I48.0 PAF (PAROXYSMAL ATRIAL FIBRILLATION) (HCC): Primary | ICD-10-CM

## 2018-09-17 DIAGNOSIS — I25.10 ARTERIOSCLEROSIS OF CORONARY ARTERY: ICD-10-CM

## 2018-09-17 DIAGNOSIS — I48.3 TYPICAL ATRIAL FLUTTER (HCC): ICD-10-CM

## 2018-09-17 PROCEDURE — 93000 ELECTROCARDIOGRAM COMPLETE: CPT | Performed by: INTERNAL MEDICINE

## 2018-09-17 PROCEDURE — 99214 OFFICE O/P EST MOD 30 MIN: CPT | Performed by: INTERNAL MEDICINE

## 2018-09-17 RX ORDER — GLIMEPIRIDE 1 MG/1
1 TABLET ORAL DAILY
COMMUNITY
Start: 2018-08-22

## 2018-09-17 RX ORDER — ACETAMINOPHEN 325 MG/1
650 TABLET ORAL EVERY 6 HOURS PRN
COMMUNITY

## 2018-09-17 RX ORDER — MIRTAZAPINE 7.5 MG/1
1 TABLET, FILM COATED ORAL
COMMUNITY
Start: 2018-08-22

## 2018-09-17 RX ORDER — DOCUSATE SODIUM 100 MG/1
100 CAPSULE, LIQUID FILLED ORAL 2 TIMES DAILY
COMMUNITY

## 2018-09-17 NOTE — PATIENT INSTRUCTIONS

## 2018-09-17 NOTE — PROGRESS NOTES
Cardiology Follow Up    Jose Washburn  1933  [id number]  [address] [facility]  [address] [address]    1  PAF (paroxysmal atrial fibrillation) (HCC)  POCT ECG   2  Hypertension, unspecified type  POCT ECG   3  Arteriosclerosis of coronary artery     4  Typical atrial flutter (HCC)     5  Paroxysmal atrial fibrillation ([address] )     6  Hypercholesterolemia       Chief Complaint   Patient presents with    Follow-up     6 mo  Patient has no cardiac complaints at this time  Interval History: Patient feels well, no complaints  CAD s/p CABG x5 1995  Atrial flutter s/p PPM    Patient Active Problem List   Diagnosis    Arteriosclerosis of coronary artery    Atrial flutter ([address] )    Hypercholesterolemia    Hypertension    Paroxysmal atrial fibrillation ([facility] )     History reviewed  No pertinent past medical history  Social History     Social History    Marital status:      Spouse name: N/A    Number of children: N/A    Years of education: N/A     Occupational History    Not on file       Social History Main Topics    Smoking status: Light Tobacco Smoker    Smokeless tobacco: Never Used    Alcohol use No    Drug use: No    Sexual activity: Not on file     Other Topics Concern    Not on file     Social History Narrative    No narrative on file      Family History   Problem Relation Age of Onset    No Known Problems Mother     Diabetes Father     Cancer Father     Hypertension Father      Past Surgical History:   Procedure Laterality Date    AV NODE ABLATION      CARDIAC PACEMAKER PLACEMENT      COLONOSCOPY      CORONARY ARTERY BYPASS GRAFT      HERNIA REPAIR         Current Outpatient Prescriptions:     acetaminophen (TYLENOL) 325 mg tablet, Take 650 mg by mouth every 6 (six) hours as needed for mild pain, Disp: , Rfl:     docusate sodium (COLACE) 100 mg capsule, Take 100 mg by mouth 2 (two) times a day, Disp: , Rfl:     donepezil (ARICEPT) 10 mg tablet, Take 1 tablet by mouth daily, Disp: , Rfl:     glimepiride (AMARYL) 1 mg tablet, Take 1 tablet by mouth daily, Disp: , Rfl:     insulin aspart protamine-insulin aspart (NOVOLOG MIX 70/30) 100 units/mL injection, Inject under the skin, Disp: , Rfl:     Levothyroxine Sodium 88 MCG CAPS, Take 1 tablet by mouth daily, Disp: , Rfl:     lisinopril (ZESTRIL) 2 5 mg tablet, Take 1 tablet by mouth daily, Disp: , Rfl:     memantine (NAMENDA) 10 mg tablet, Take 1 tablet by mouth 2 (two) times a day, Disp: , Rfl:     metoprolol succinate (TOPROL-XL) 25 mg 24 hr tablet, Take 1 tablet by mouth daily, Disp: , Rfl:     rosuvastatin (CRESTOR) 10 MG tablet, Take 1 tablet by mouth daily, Disp: , Rfl:     tamsulosin (FLOMAX) 0 4 mg, Take 1 capsule by mouth daily, Disp: , Rfl:     levETIRAcetam (KEPPRA) 250 mg tablet, Take 0 5 tablets by mouth 2 (two) times a day, Disp: , Rfl:     mirtazapine (REMERON) 7 5 MG tablet, Take 1 tablet by mouth daily at bedtime, Disp: , Rfl:   No Known Allergies    Labs:  No visits with results within 6 Month(s) from this visit  Latest known visit with results is:   No results found for any previous visit  No results found for: CHOL, TRIG, HDL, LDLDIRECT  Imaging: No results found  Review of Systems:  Review of Systems   Constitutional: Positive for activity change and fatigue  Negative for appetite change and fever  HENT: Negative for nosebleeds and sore throat  Eyes: Negative for photophobia and visual disturbance  Respiratory: Positive for cough  Negative for chest tightness, shortness of breath and wheezing  Cardiovascular: Negative for chest pain, palpitations and leg swelling  Gastrointestinal: Negative for abdominal pain, diarrhea, nausea and vomiting  Endocrine: Negative for polyuria  Genitourinary: Negative for dysuria, frequency and hematuria     Musculoskeletal: Negative for arthralgias, back pain and gait problem  Skin: Negative for pallor and rash  Neurological: Negative for dizziness, syncope, speech difficulty and light-headedness  Hematological: Does not bruise/bleed easily  Psychiatric/Behavioral: Negative for agitation, behavioral problems and confusion  Physical Exam:  Physical Exam   Constitutional: He is oriented to person, place, and time  He appears well-developed and well-nourished  No distress  HENT:   Head: Normocephalic and atraumatic  Nose: Nose normal    Eyes: EOM are normal  Pupils are equal, round, and reactive to light  No scleral icterus  Neck: Normal range of motion  Neck supple  No JVD present  Cardiovascular: Normal rate, regular rhythm, S1 normal, S2 normal and normal heart sounds  Exam reveals no gallop, no S3, no distant heart sounds and no friction rub  No murmur heard  No systolic murmur is present   Pulmonary/Chest: Effort normal and breath sounds normal  No respiratory distress  He has no wheezes  He has no rales  Abdominal: Soft  Bowel sounds are normal  He exhibits no distension  There is no tenderness  Musculoskeletal: Normal range of motion  He exhibits no edema or deformity  Neurological: He is alert and oriented to person, place, and time  No cranial nerve deficit  Skin: Skin is warm and dry  No rash noted  He is not diaphoretic  No erythema  Psychiatric: He has a normal mood and affect  His behavior is normal    Vitals reviewed  Blood pressure 122/58, pulse 70, height 5' 10" (1 778 m), SpO2 99 %  EKG:  Atrial flutter  Electronic ventricular pacemaker  Abnormal ECG    Discussion/Summary:  CAD: s/p CABG x5 in 1995, stable and asymptomatic  Continue medical therapy with ASA, statin, and ACE-I  He could not tolerate the B-blocker due to extreme fatigue and low BPs in the past - but now restarted on metoprolol due to atrial tachycardia and NSVT seen on PPM interrogation   Tolerating it thus far - have recommended that he takes his metoprolol at night to reduce the fatigue - which has helped       Atrial flutter: now also with paroxysms of atrial fibrillation  Not on chronic anticoagulation  Continue regular PPM interrogations  Started on metoprolol and noted to have overall less tachycardia than before starting on B-blocker  CHADS2-Vasc score of 4 - we initiated on Eliquis 2 5mg BID, which he is tolerating well       HTN: well controlled on current regimen      HLD: continue statin  LDL 83 in Sept 2016, well controlled  Recheck now

## 2018-10-25 ENCOUNTER — REMOTE DEVICE CLINIC VISIT (OUTPATIENT)
Dept: CARDIOLOGY CLINIC | Facility: CLINIC | Age: 83
End: 2018-10-25
Payer: MEDICARE

## 2018-10-25 DIAGNOSIS — I48.0 PAF (PAROXYSMAL ATRIAL FIBRILLATION) (HCC): Primary | ICD-10-CM

## 2018-10-25 DIAGNOSIS — Z95.0 CARDIAC PACEMAKER IN SITU: ICD-10-CM

## 2018-10-25 PROCEDURE — 93296 REM INTERROG EVL PM/IDS: CPT | Performed by: INTERNAL MEDICINE

## 2018-10-25 PROCEDURE — 93294 REM INTERROG EVL PM/LDLS PM: CPT | Performed by: INTERNAL MEDICINE

## 2018-10-25 NOTE — PROGRESS NOTES
Results for orders placed or performed in visit on 10/25/18   Cardiac EP device report    Narrative    BSC DUAL PPM  LATITUDE TRANSMISSION: BATTERY STATUS "OK"  AP 2%  98%  ALL AVAILABLE LEAD PARAMETERS WITHIN NORMAL LIMITS  2 NSVT NOTED  MULTIPLE AHRS NOTED, 13% OF TIME  PMT NOTED  AVAIL EGRAMS SHOW AF & NSVT  PT ON METO SUCC  PER 9/2018 OV NOTE PT ON ELIQUIS  NORMAL DEVICE FUNCTION   NC

## 2019-03-12 ENCOUNTER — TELEPHONE (OUTPATIENT)
Dept: CARDIOLOGY CLINIC | Facility: MEDICAL CENTER | Age: 84
End: 2019-03-12

## 2019-03-12 NOTE — TELEPHONE ENCOUNTER
Called [facility] during home to make sure that the lipid panal was done  They said they will bring a copy when patient comes to appt time next week

## 2019-03-21 ENCOUNTER — OFFICE VISIT (OUTPATIENT)
Dept: CARDIOLOGY CLINIC | Facility: MEDICAL CENTER | Age: 84
End: 2019-03-21
Payer: MEDICARE

## 2019-03-21 VITALS
DIASTOLIC BLOOD PRESSURE: 54 MMHG | OXYGEN SATURATION: 95 % | HEIGHT: 70 IN | SYSTOLIC BLOOD PRESSURE: 102 MMHG | WEIGHT: 171 LBS | HEART RATE: 67 BPM | BODY MASS INDEX: 24.48 KG/M2

## 2019-03-21 DIAGNOSIS — E78.00 HYPERCHOLESTEROLEMIA: ICD-10-CM

## 2019-03-21 DIAGNOSIS — I25.10 ARTERIOSCLEROSIS OF CORONARY ARTERY: ICD-10-CM

## 2019-03-21 DIAGNOSIS — I48.92 ATRIAL FLUTTER, UNSPECIFIED TYPE (HCC): Primary | ICD-10-CM

## 2019-03-21 DIAGNOSIS — I48.0 PAROXYSMAL ATRIAL FIBRILLATION (HCC): ICD-10-CM

## 2019-03-21 DIAGNOSIS — I48.3 TYPICAL ATRIAL FLUTTER (HCC): ICD-10-CM

## 2019-03-21 DIAGNOSIS — I10 HYPERTENSION, UNSPECIFIED TYPE: ICD-10-CM

## 2019-03-21 PROCEDURE — 99214 OFFICE O/P EST MOD 30 MIN: CPT | Performed by: INTERNAL MEDICINE

## 2019-03-21 PROCEDURE — 93000 ELECTROCARDIOGRAM COMPLETE: CPT | Performed by: INTERNAL MEDICINE

## 2019-03-21 RX ORDER — LOPERAMIDE HYDROCHLORIDE 2 MG/1
2 CAPSULE ORAL 4 TIMES DAILY PRN
COMMUNITY

## 2019-03-21 RX ORDER — INSULIN GLARGINE 100 [IU]/ML
10 INJECTION, SOLUTION SUBCUTANEOUS
COMMUNITY

## 2019-03-21 NOTE — PATIENT INSTRUCTIONS

## 2019-03-21 NOTE — PROGRESS NOTES
Cardiology Follow Up    Tayla Posada  1933  [id number]  [address] [facility]  [address] [facility] [address]    1  Atrial flutter, unspecified type ([facility] )  POCT ECG   2  Hypertension, unspecified type  POCT ECG   3  Typical atrial flutter ([facility] )     4  Arteriosclerosis of coronary artery     5  Hypercholesterolemia     6  Paroxysmal atrial fibrillation [facility])       Chief Complaint   Patient presents with    Follow-up     6 month return- no cardiac complaints       Interval History: Patient feels well, without complaints  No reported chest pain, shortness of breath, palpitations, lightheadedness, syncope, LE edema, orthopnea, PND, or significant weight changes  Patient remains active without any increased fatigue out of the ordinary  CAD s/p CABG x5 1995  Atrial flutter s/p PPM    Patient Active Problem List   Diagnosis    Arteriosclerosis of coronary artery    Atrial flutter ([facility] )    Hypercholesterolemia    Hypertension    Paroxysmal atrial fibrillation ([facility] )     History reviewed  No pertinent past medical history    Social History     Socioeconomic History    Marital status:      Spouse name: Not on file    Number of children: Not on file    Years of education: Not on file    Highest education level: Not on file   Occupational History    Not on file   Social Needs    Financial resource strain: Not on file    Food insecurity:     Worry: Not on file     Inability: Not on file    Transportation needs:     Medical: Not on file     Non-medical: Not on file   Tobacco Use    Smoking status: Light Tobacco Smoker    Smokeless tobacco: Never Used   Substance and Sexual Activity    Alcohol use: No    Drug use: No    Sexual activity: Not on file   Lifestyle    Physical activity:     Days per week: Not on file     Minutes per session: Not on file    Stress: Not on file   Relationships    Social connections:     Talks on phone: Not on file     Gets together: Not on file     Attends Taoist service: Not on file     Active member of club or organization: Not on file     Attends meetings of clubs or organizations: Not on file     Relationship status: Not on file    Intimate partner violence:     Fear of current or ex partner: Not on file     Emotionally abused: Not on file     Physically abused: Not on file     Forced sexual activity: Not on file   Other Topics Concern    Not on file   Social History Narrative    Not on file      Family History   Problem Relation Age of Onset    No Known Problems Mother     Diabetes Father     Cancer Father     Hypertension Father      Past Surgical History:   Procedure Laterality Date    AV NODE ABLATION      CARDIAC PACEMAKER PLACEMENT      COLONOSCOPY      CORONARY ARTERY BYPASS GRAFT      HERNIA REPAIR         Current Outpatient Medications:     acetaminophen (TYLENOL) 325 mg tablet, Take 650 mg by mouth every 6 (six) hours as needed for mild pain, Disp: , Rfl:     docusate sodium (COLACE) 100 mg capsule, Take 100 mg by mouth 2 (two) times a day, Disp: , Rfl:     donepezil (ARICEPT) 10 mg tablet, Take 1 tablet by mouth daily, Disp: , Rfl:     glimepiride (AMARYL) 1 mg tablet, Take 1 tablet by mouth daily, Disp: , Rfl:     insulin glargine (LANTUS) 100 units/mL subcutaneous injection, Inject 10 Units under the skin daily at bedtime, Disp: , Rfl:     Levothyroxine Sodium 88 MCG CAPS, Take 1 tablet by mouth daily, Disp: , Rfl:     lisinopril (ZESTRIL) 2 5 mg tablet, Take 1 tablet by mouth daily, Disp: , Rfl:     loperamide (IMODIUM) 2 mg capsule, Take 2 mg by mouth 4 (four) times a day as needed for diarrhea, Disp: , Rfl:     memantine (NAMENDA) 10 mg tablet, Take 1 tablet by mouth 2 (two) times a day, Disp: , Rfl:     metoprolol succinate (TOPROL-XL) 25 mg 24 hr tablet, Take 1 tablet by mouth daily, Disp: , Rfl:     mirtazapine (REMERON) 7 5 MG tablet, Take 1 tablet by mouth daily at bedtime, Disp: , Rfl:     rosuvastatin (CRESTOR) 10 MG tablet, Take 1 tablet by mouth daily, Disp: , Rfl:     tamsulosin (FLOMAX) 0 4 mg, Take 1 capsule by mouth daily, Disp: , Rfl:   No Known Allergies    Labs:  No visits with results within 6 Month(s) from this visit  Latest known visit with results is:   No results found for any previous visit  No results found for: CHOL, TRIG, HDL, LDLDIRECT  Imaging: No results found  Review of Systems:  Review of Systems   Constitutional: Positive for fatigue  Negative for activity change, appetite change and fever  HENT: Negative for nosebleeds and sore throat  Eyes: Negative for photophobia and visual disturbance  Respiratory: Positive for cough  Negative for chest tightness, shortness of breath and wheezing  Cardiovascular: Negative for chest pain, palpitations and leg swelling  Gastrointestinal: Negative for abdominal pain, diarrhea, nausea and vomiting  Endocrine: Negative for polyuria  Genitourinary: Negative for dysuria, frequency and hematuria  Musculoskeletal: Negative for arthralgias, back pain and gait problem  Skin: Negative for pallor and rash  Neurological: Negative for dizziness, syncope, speech difficulty and light-headedness  Hematological: Does not bruise/bleed easily  Psychiatric/Behavioral: Negative for agitation, behavioral problems and confusion  Physical Exam:  Physical Exam   Constitutional: He is oriented to person, place, and time  He appears well-developed and well-nourished  HENT:   Head: Normocephalic and atraumatic  Nose: Nose normal    Eyes: Pupils are equal, round, and reactive to light  EOM are normal  No scleral icterus  Neck: Normal range of motion  Neck supple  No JVD present  Cardiovascular: Normal rate, regular rhythm and normal heart sounds  Exam reveals no gallop and no friction rub  No murmur heard    Pulmonary/Chest: Effort normal and breath sounds normal  No respiratory distress  He has no wheezes  He has no rales  Abdominal: Soft  Bowel sounds are normal  He exhibits no distension  There is no tenderness  Musculoskeletal: Normal range of motion  He exhibits no edema or deformity  Neurological: He is alert and oriented to person, place, and time  No cranial nerve deficit  Skin: Skin is warm and dry  No rash noted  He is not diaphoretic  Psychiatric: He has a normal mood and affect  His behavior is normal    Vitals reviewed  Blood pressure 102/54, pulse 67, height 5' 10" (1 778 m), weight 77 6 kg (171 lb), SpO2 95 %  EKG:  Electronic ventricular pacemaker  Abnormal ECG    Discussion/Summary:  CAD: s/p CABG x5 in 1995, stable and asymptomatic  Continue medical therapy with ASA, statin, and ACE-I  He could not tolerate the B-blocker due to extreme fatigue and low BPs in the past - but now restarted on metoprolol due to atrial tachycardia and NSVT seen on PPM interrogation  Tolerating it thus far - have recommended that he takes his metoprolol at night to reduce the fatigue - which has helped  He feels well from this perspective      Atrial flutter: now also with paroxysms of atrial fibrillation  Not on chronic anticoagulation  Continue regular PPM interrogations  Started on metoprolol and noted to have overall less tachycardia than before starting on B-blocker  CHADS2-Vasc score of 4 - we initiated on Eliquis 2 5mg BID, which he is tolerating well  No bleeding or excessive bruising noted       HTN: well controlled on current regimen      HLD: continue statin  LDL 83 in Sept 2016, well controlled  Recheck was ordered at last visit - not completed yet

## 2019-07-29 ENCOUNTER — IN-CLINIC DEVICE VISIT (OUTPATIENT)
Dept: CARDIOLOGY CLINIC | Facility: MEDICAL CENTER | Age: 84
End: 2019-07-29
Payer: MEDICARE

## 2019-07-29 DIAGNOSIS — Z95.0 PRESENCE OF PERMANENT CARDIAC PACEMAKER: ICD-10-CM

## 2019-07-29 DIAGNOSIS — I48.0 PAROXYSMAL ATRIAL FIBRILLATION (HCC): Primary | ICD-10-CM

## 2019-07-29 DIAGNOSIS — I44.2 CHB (COMPLETE HEART BLOCK) (HCC): ICD-10-CM

## 2019-07-29 PROCEDURE — 93280 PM DEVICE PROGR EVAL DUAL: CPT | Performed by: INTERNAL MEDICINE

## 2019-07-29 NOTE — PROGRESS NOTES
BSC DUAL PPM  DEVICE INTERROGATED IN THE [employer] OFFICE:  BATTERY VOLTAGE ADEQUATE (1 5 YR)    AP 1%  99% (CHB)   ALL AVAILABLE LEAD PARAMETERS WITHIN NORMAL LIMITS   9 NON SUST V EVENTS WITH 2 AVAILABLE EGMS SHOWING NSVT (9 @ 137 BPM, 5 @ 159 BPM)   9 ATR WITH ALL AVAILABLE EGMS SHOWING AF/RVR (11%)   LONGEST EPISODE ~ 13 HR   NO EF LISTED   PT TAKES ASA AND METOPROLOL, NO AC   TASKED TO DR BEAUCHAMP   NO PROGRAMMING CHANGES MADE TO DEVICE PARAMETERS   NORMAL DEVICE FUNCTION   RG

## 2019-10-31 ENCOUNTER — REMOTE DEVICE CLINIC VISIT (OUTPATIENT)
Dept: CARDIOLOGY CLINIC | Facility: CLINIC | Age: 84
End: 2019-10-31
Payer: MEDICARE

## 2019-10-31 DIAGNOSIS — Z95.0 CARDIAC PACEMAKER IN SITU: Primary | ICD-10-CM

## 2019-10-31 PROCEDURE — 93296 REM INTERROG EVL PM/IDS: CPT | Performed by: INTERNAL MEDICINE

## 2019-10-31 PROCEDURE — 93294 REM INTERROG EVL PM/LDLS PM: CPT | Performed by: INTERNAL MEDICINE

## 2019-10-31 NOTE — PROGRESS NOTES
Results for orders placed or performed in visit on 10/31/19   Cardiac EP device report    Narrative    BSC DUAL PPM  LATITUDE TRANSMISSION: BATTERY VOLTAGE ADEQUATE (1 5 YR)  AP 2%  100% (>40%/CHB)  ALL AVAILABLE LEAD PARAMETERS WITHIN NORMAL LIMITS  NO NEW SIGNIFICANT HIGH RATE EPISODES  PMT NOTED  PACEMAKER FUNCTIONING APPROPRIATELY      EB

## 2020-01-02 ENCOUNTER — OFFICE VISIT (OUTPATIENT)
Dept: CARDIOLOGY CLINIC | Facility: MEDICAL CENTER | Age: 85
End: 2020-01-02
Payer: MEDICARE

## 2020-01-02 VITALS
DIASTOLIC BLOOD PRESSURE: 58 MMHG | SYSTOLIC BLOOD PRESSURE: 100 MMHG | BODY MASS INDEX: 24.54 KG/M2 | HEART RATE: 60 BPM | OXYGEN SATURATION: 94 % | HEIGHT: 70 IN

## 2020-01-02 DIAGNOSIS — I48.3 TYPICAL ATRIAL FLUTTER (HCC): Primary | ICD-10-CM

## 2020-01-02 DIAGNOSIS — I10 ESSENTIAL HYPERTENSION: ICD-10-CM

## 2020-01-02 DIAGNOSIS — I48.0 PAROXYSMAL ATRIAL FIBRILLATION (HCC): ICD-10-CM

## 2020-01-02 DIAGNOSIS — E78.00 HYPERCHOLESTEROLEMIA: ICD-10-CM

## 2020-01-02 DIAGNOSIS — I25.10 ARTERIOSCLEROSIS OF CORONARY ARTERY: ICD-10-CM

## 2020-01-02 PROCEDURE — 93000 ELECTROCARDIOGRAM COMPLETE: CPT | Performed by: INTERNAL MEDICINE

## 2020-01-02 PROCEDURE — 99214 OFFICE O/P EST MOD 30 MIN: CPT | Performed by: INTERNAL MEDICINE

## 2020-01-02 RX ORDER — LEVOTHYROXINE SODIUM 0.12 MG/1
TABLET ORAL
COMMUNITY
Start: 2019-12-24

## 2020-01-02 NOTE — PATIENT INSTRUCTIONS

## 2020-01-02 NOTE — PROGRESS NOTES
Cardiology Follow Up    [facility]  1933  [id number]  [address] [facility] [place]  [address] [address]    1  Typical atrial flutter (HCC)  POCT ECG   2  Essential hypertension  POCT ECG   3  Paroxysmal atrial fibrillation (HCC)  POCT ECG   4  Arteriosclerosis of coronary artery     5  Hypercholesterolemia       Chief Complaint   Patient presents with    Follow-up     6 mo  Patient has no cardiac compalints at this time  Interval History: Patient feels well, without complaints other than mild fatigue  No reported chest pain, shortness of breath, palpitations, lightheadedness, syncope, LE edema, orthopnea, PND, or significant weight changes  CAD s/p CABG x5 1995  Atrial flutter s/p PPM    Patient Active Problem List   Diagnosis    Arteriosclerosis of coronary artery    Atrial flutter ([facility] )    Hypercholesterolemia    Hypertension    Paroxysmal atrial fibrillation ([facility] )     History reviewed  No pertinent past medical history    Social History     Socioeconomic History    Marital status:      Spouse name: Not on file    Number of children: Not on file    Years of education: Not on file    Highest education level: Not on file   Occupational History    Not on file   Social Needs    Financial resource strain: Not on file    Food insecurity:     Worry: Not on file     Inability: Not on file    Transportation needs:     Medical: Not on file     Non-medical: Not on file   Tobacco Use    Smoking status: Light Tobacco Smoker    Smokeless tobacco: Never Used   Substance and Sexual Activity    Alcohol use: No    Drug use: No    Sexual activity: Not on file   Lifestyle    Physical activity:     Days per week: Not on file     Minutes per session: Not on file    Stress: Not on file   Relationships    Social connections:     Talks on phone: Not on file     Gets together: Not on file Attends Voodoo service: Not on file     Active member of club or organization: Not on file     Attends meetings of clubs or organizations: Not on file     Relationship status: Not on file    Intimate partner violence:     Fear of current or ex partner: Not on file     Emotionally abused: Not on file     Physically abused: Not on file     Forced sexual activity: Not on file   Other Topics Concern    Not on file   Social History Narrative    Not on file      Family History   Problem Relation Age of Onset    No Known Problems Mother     Diabetes Father     Cancer Father     Hypertension Father      Past Surgical History:   Procedure Laterality Date    AV NODE ABLATION      CARDIAC PACEMAKER PLACEMENT      COLONOSCOPY      CORONARY ARTERY BYPASS GRAFT      HERNIA REPAIR         Current Outpatient Medications:     acetaminophen (TYLENOL) 325 mg tablet, Take 650 mg by mouth every 6 (six) hours as needed for mild pain, Disp: , Rfl:     docusate sodium (COLACE) 100 mg capsule, Take 100 mg by mouth 2 (two) times a day, Disp: , Rfl:     donepezil (ARICEPT) 10 mg tablet, Take 1 tablet by mouth daily, Disp: , Rfl:     glimepiride (AMARYL) 1 mg tablet, Take 1 tablet by mouth daily, Disp: , Rfl:     levothyroxine 125 mcg tablet, , Disp: , Rfl:     lisinopril (ZESTRIL) 2 5 mg tablet, Take 1 tablet by mouth daily, Disp: , Rfl:     loperamide (IMODIUM) 2 mg capsule, Take 2 mg by mouth 4 (four) times a day as needed for diarrhea, Disp: , Rfl:     memantine (NAMENDA) 10 mg tablet, Take 1 tablet by mouth 2 (two) times a day, Disp: , Rfl:     metoprolol succinate (TOPROL-XL) 25 mg 24 hr tablet, Take 1 tablet by mouth daily, Disp: , Rfl:     mirtazapine (REMERON) 7 5 MG tablet, Take 1 tablet by mouth daily at bedtime, Disp: , Rfl:     rosuvastatin (CRESTOR) 10 MG tablet, Take 1 tablet by mouth daily, Disp: , Rfl:     tamsulosin (FLOMAX) 0 4 mg, Take 1 capsule by mouth daily, Disp: , Rfl:     insulin glargine (LANTUS) 100 units/mL subcutaneous injection, Inject 10 Units under the skin daily at bedtime, Disp: , Rfl:   No Known Allergies    Labs:  No visits with results within 6 Month(s) from this visit  Latest known visit with results is:   No results found for any previous visit  No results found for: CHOL, TRIG, HDL, LDLDIRECT  Imaging: No results found  Review of Systems:  Review of Systems   Constitutional: Positive for fatigue  Negative for activity change, appetite change and fever  HENT: Negative for nosebleeds and sore throat  Eyes: Negative for photophobia and visual disturbance  Respiratory: Negative for cough, chest tightness, shortness of breath and wheezing  Cardiovascular: Negative for chest pain, palpitations and leg swelling  Gastrointestinal: Negative for abdominal pain, diarrhea, nausea and vomiting  Endocrine: Negative for polyuria  Genitourinary: Negative for dysuria, frequency and hematuria  Musculoskeletal: Negative for arthralgias, back pain and gait problem  Skin: Negative for pallor and rash  Neurological: Negative for dizziness, syncope, speech difficulty and light-headedness  Hematological: Does not bruise/bleed easily  Psychiatric/Behavioral: Negative for agitation, behavioral problems and confusion  Physical Exam:  Physical Exam   Constitutional: He is oriented to person, place, and time  He appears well-developed and well-nourished  HENT:   Head: Normocephalic and atraumatic  Nose: Nose normal    Eyes: Pupils are equal, round, and reactive to light  EOM are normal  No scleral icterus  Neck: Normal range of motion  Neck supple  No JVD present  Cardiovascular: Normal rate, regular rhythm and normal heart sounds  Exam reveals no gallop and no friction rub  No murmur heard  Pulmonary/Chest: Effort normal and breath sounds normal  No respiratory distress  He has no wheezes  He has no rales  Abdominal: Soft   Bowel sounds are normal  He exhibits no distension  There is no tenderness  Musculoskeletal: Normal range of motion  He exhibits no edema or deformity  Neurological: He is alert and oriented to person, place, and time  No cranial nerve deficit  Skin: Skin is warm and dry  No rash noted  He is not diaphoretic  Psychiatric: He has a normal mood and affect  His behavior is normal    Vitals reviewed  Blood pressure 100/58, pulse 60, height 5' 10" (1 778 m), SpO2 94 %  EKG:  Electronic ventricular pacemaker  Abnormal ECG    Discussion/Summary:  CAD: s/p CABG x5 in 1995, stable and asymptomatic  Continue medical therapy with ASA, statin, and ACE-I  He could not tolerate the B-blocker due to extreme fatigue and low BPs in the past - but now restarted on metoprolol due to atrial tachycardia and NSVT seen on PPM interrogation  Tolerating it thus far - have recommended that he takes his metoprolol at night to reduce the fatigue - which has helped  He feels well from this perspective      Atrial flutter: now also with paroxysms of atrial fibrillation  Not on chronic anticoagulation  Continue regular PPM interrogations  Started on metoprolol and noted to have overall less tachycardia than before starting on B-blocker  CHADS2-Vasc score of 4 - we initiated on Eliquis 2 5mg BID, which was stopped at his facility due to bleeding risk        HTN: well controlled on current regimen      HLD: continue statin  LDL 83 in Sept 2016, well controlled  Recheck was ordered at last visit - not completed yet

## 2020-02-12 ENCOUNTER — REMOTE DEVICE CLINIC VISIT (OUTPATIENT)
Dept: CARDIOLOGY CLINIC | Facility: CLINIC | Age: 85
End: 2020-02-12
Payer: MEDICARE

## 2020-02-12 DIAGNOSIS — Z95.0 CARDIAC PACEMAKER IN SITU: Primary | ICD-10-CM

## 2020-02-12 PROCEDURE — 93296 REM INTERROG EVL PM/IDS: CPT | Performed by: INTERNAL MEDICINE

## 2020-02-12 PROCEDURE — 93294 REM INTERROG EVL PM/LDLS PM: CPT | Performed by: INTERNAL MEDICINE

## 2020-02-13 NOTE — PROGRESS NOTES
Results for orders placed or performed in visit on 02/12/20   Cardiac EP device report    Narrative    BSC DUAL PPM  LATITUDE TRANSMISSION: BATTERY STATUS "OK"  AP 2%  100%  ALL AVAILABLE LEAD PARAMETERS WITHIN NORMAL LIMITS  2 AHRS; HX OF PAF  NO AC DUE TO BLDING RISK-ON ASA & METO SUCC  PMT NOTED  NORMAL DEVICE FUNCTION   NC

## 2020-03-17 ENCOUNTER — REMOTE DEVICE CLINIC VISIT (OUTPATIENT)
Dept: CARDIOLOGY CLINIC | Facility: CLINIC | Age: 85
End: 2020-03-17

## 2020-03-17 DIAGNOSIS — Z95.0 PRESENCE OF CARDIAC PACEMAKER: Primary | ICD-10-CM

## 2020-03-17 PROCEDURE — 99024 POSTOP FOLLOW-UP VISIT: CPT | Performed by: INTERNAL MEDICINE

## 2020-03-17 NOTE — PROGRESS NOTES
Results for orders placed or performed in visit on 03/17/20   Cardiac EP device report    Narrative    BSC DUAL PPM  NON-BILLABLE LATITUDE TRANSMISSION BATTERY STATUS: BATTERY VOLTAGE NEARING ERWIN (11 MOS~CHB~MAGNET RATE 100 PPM)  WILL SCHEDULE MONTHLY BATTERY CHECKS  AP: 2%  : 100% (>40%~CHB)  ALL AVAILABLE LEAD PARAMETERS WITHIN NORMAL LIMITS  2 NONSUSTV EPISODES W/ EGRAMS SHOWING NSVT 14 BEATS @ 163 BPM & 3 BEATS @ 136 BPM   PMT NOTED  PT TAKES METOPROLOL SUCC  NO RECENT ECHO IN EPIC  TASK TO DR BEAUCHAMP  PACEMAKER FUNCTIONING APPROPRIATELY    [address] [place] [address]

## 2020-04-20 ENCOUNTER — REMOTE DEVICE CLINIC VISIT (OUTPATIENT)
Dept: CARDIOLOGY CLINIC | Facility: CLINIC | Age: 85
End: 2020-04-20

## 2020-04-20 DIAGNOSIS — Z95.0 CARDIAC PACEMAKER IN SITU: Primary | ICD-10-CM

## 2020-04-20 PROCEDURE — RECHECK: Performed by: INTERNAL MEDICINE

## 2020-11-04 NOTE — PROGRESS NOTES
"  Discussion/Summary  Normal device function      Results/Data  Cardiac Device Remote [id number] 02:54AM [employer]     Test Name Result Flag Reference   MISCELLANEOUS COMMENT (Report)     NONBILLABLE- LATITUDE TRANSMISSION FOR NEW AF & NSVT EPISODES  3 CONSECUTIVE AF EPISODES ON 5/7/17 FOR TOTAL 3 HRS & 3 CONSECUTIVE AF EPISODES ON 5/30/17 FOR TOTAL 2 75 HRS  2 NSVT EPISODES- 4 BEATS, AVG CL~325MS & 16 BEATS, RATE VARIABLE & THEN ACCELERATED & STABILIZED @ CL~315MS  EF-55-65% (ECHO 5/17/12)  PT ON ASA 81MG & METOPROLOL  SPOKE TO & TASKED DR Dinh Cancer  AP-13%, -99% (>40% DDD @ 50 PPM)  ALL AVAILABLE LEAD PARAMETERS WITHIN NORMAL LIMITS  NORMAL DEVICE FUNCTION  GV   Cardiac Electrophysiology Report      [id number]  pdf   DEVICE TYPE Pacemaker       Cardiac Electrophysiology Report [id number] 02:54AM [facility]     Test Name Result Flag Reference   Cardiac Electrophysiology Report      [id number]  pdf     Signatures   Electronically signed by : Joseline Rosa RN; Jun 6 2017  8:53AM EST                       (Author)    Electronically signed by : Erwin Garza DO; Jun 9 2017  6:13PM EST                       (Author)    "
No difficulties